# Patient Record
Sex: MALE | Race: WHITE | URBAN - METROPOLITAN AREA
[De-identification: names, ages, dates, MRNs, and addresses within clinical notes are randomized per-mention and may not be internally consistent; named-entity substitution may affect disease eponyms.]

---

## 2024-11-23 ENCOUNTER — HOSPITAL ENCOUNTER (EMERGENCY)
Age: 24
Discharge: HOME | End: 2024-11-23
Attending: EMERGENCY MEDICINE

## 2024-11-23 VITALS
SYSTOLIC BLOOD PRESSURE: 115 MMHG | WEIGHT: 135.36 LBS | OXYGEN SATURATION: 98 % | HEART RATE: 90 BPM | TEMPERATURE: 98.1 F | BODY MASS INDEX: 18.95 KG/M2 | HEIGHT: 71 IN | RESPIRATION RATE: 12 BRPM | DIASTOLIC BLOOD PRESSURE: 84 MMHG

## 2024-11-23 DIAGNOSIS — E10.649 TYPE 1 DIABETES MELLITUS WITH HYPOGLYCEMIA AND WITHOUT COMA (HCC): Primary | ICD-10-CM

## 2024-11-23 LAB
ALBUMIN SERPL-MCNC: 4.3 G/DL (ref 3.4–5)
ALBUMIN/GLOB SERPL: 1.4 {RATIO} (ref 1.1–2.2)
ALP SERPL-CCNC: 126 U/L (ref 40–129)
ALT SERPL-CCNC: 16 U/L (ref 10–40)
ANION GAP SERPL CALCULATED.3IONS-SCNC: 14 MMOL/L (ref 3–16)
AST SERPL-CCNC: 23 U/L (ref 15–37)
BASE EXCESS BLDV CALC-SCNC: -1.3 MMOL/L
BASOPHILS # BLD: 0.1 K/UL (ref 0–0.2)
BASOPHILS NFR BLD: 0.6 %
BETA-HYDROXYBUTYRATE: 0.09 MMOL/L (ref 0–0.27)
BILIRUB SERPL-MCNC: <0.2 MG/DL (ref 0–1)
BUN SERPL-MCNC: 11 MG/DL (ref 7–20)
CALCIUM SERPL-MCNC: 9.6 MG/DL (ref 8.3–10.6)
CHLORIDE SERPL-SCNC: 104 MMOL/L (ref 99–110)
CO2 BLDV-SCNC: 27 MMOL/L
CO2 SERPL-SCNC: 22 MMOL/L (ref 21–32)
COHGB MFR BLDV: 2.8 %
CREAT SERPL-MCNC: 0.8 MG/DL (ref 0.9–1.3)
DEPRECATED RDW RBC AUTO: 14.9 % (ref 12.4–15.4)
EOSINOPHIL # BLD: 0.1 K/UL (ref 0–0.6)
EOSINOPHIL NFR BLD: 1.2 %
GFR SERPLBLD CREATININE-BSD FMLA CKD-EPI: >90 ML/MIN/{1.73_M2}
GLUCOSE BLD-MCNC: 117 MG/DL (ref 70–99)
GLUCOSE BLD-MCNC: 147 MG/DL (ref 70–99)
GLUCOSE BLD-MCNC: 38 MG/DL (ref 70–99)
GLUCOSE BLD-MCNC: 40 MG/DL (ref 70–99)
GLUCOSE SERPL-MCNC: 83 MG/DL (ref 70–99)
HCO3 BLDV-SCNC: 26 MMOL/L (ref 23–29)
HCT VFR BLD AUTO: 45.2 % (ref 40.5–52.5)
HGB BLD-MCNC: 15.2 G/DL (ref 13.5–17.5)
LYMPHOCYTES # BLD: 2.2 K/UL (ref 1–5.1)
LYMPHOCYTES NFR BLD: 19.5 %
MAGNESIUM SERPL-MCNC: 2 MG/DL (ref 1.8–2.4)
MCH RBC QN AUTO: 30.7 PG (ref 26–34)
MCHC RBC AUTO-ENTMCNC: 33.6 G/DL (ref 31–36)
MCV RBC AUTO: 91.4 FL (ref 80–100)
METHGB MFR BLDV: 0.6 %
MONOCYTES # BLD: 1 K/UL (ref 0–1.3)
MONOCYTES NFR BLD: 8.3 %
NEUTROPHILS # BLD: 8.1 K/UL (ref 1.7–7.7)
NEUTROPHILS NFR BLD: 70.4 %
O2 THERAPY: ABNORMAL
PCO2 BLDV: 50.6 MMHG (ref 40–50)
PERFORMED ON: ABNORMAL
PH BLDV: 7.31 [PH] (ref 7.35–7.45)
PLATELET # BLD AUTO: 503 K/UL (ref 135–450)
PMV BLD AUTO: 7.6 FL (ref 5–10.5)
PO2 BLDV: 32 MMHG
POTASSIUM SERPL-SCNC: 3.5 MMOL/L (ref 3.5–5.1)
PROT SERPL-MCNC: 7.3 G/DL (ref 6.4–8.2)
RBC # BLD AUTO: 4.95 M/UL (ref 4.2–5.9)
SAO2 % BLDV: 55 %
SODIUM SERPL-SCNC: 140 MMOL/L (ref 136–145)
WBC # BLD AUTO: 11.5 K/UL (ref 4–11)

## 2024-11-23 PROCEDURE — 82010 KETONE BODYS QUAN: CPT

## 2024-11-23 PROCEDURE — 99284 EMERGENCY DEPT VISIT MOD MDM: CPT

## 2024-11-23 PROCEDURE — 2500000003 HC RX 250 WO HCPCS

## 2024-11-23 PROCEDURE — 83735 ASSAY OF MAGNESIUM: CPT

## 2024-11-23 PROCEDURE — 80053 COMPREHEN METABOLIC PANEL: CPT

## 2024-11-23 PROCEDURE — 6370000000 HC RX 637 (ALT 250 FOR IP): Performed by: EMERGENCY MEDICINE

## 2024-11-23 PROCEDURE — 96374 THER/PROPH/DIAG INJ IV PUSH: CPT

## 2024-11-23 PROCEDURE — 85025 COMPLETE CBC W/AUTO DIFF WBC: CPT

## 2024-11-23 PROCEDURE — 82803 BLOOD GASES ANY COMBINATION: CPT

## 2024-11-23 RX ORDER — NICOTINE POLACRILEX 4 MG
15 LOZENGE BUCCAL ONCE
Status: COMPLETED | OUTPATIENT
Start: 2024-11-23 | End: 2024-11-23

## 2024-11-23 RX ORDER — DEXTROSE MONOHYDRATE 25 G/50ML
25 INJECTION, SOLUTION INTRAVENOUS ONCE
Status: COMPLETED | OUTPATIENT
Start: 2024-11-23 | End: 2024-11-23

## 2024-11-23 RX ORDER — DEXTROSE MONOHYDRATE 25 G/50ML
INJECTION, SOLUTION INTRAVENOUS
Status: COMPLETED
Start: 2024-11-23 | End: 2024-11-23

## 2024-11-23 RX ADMIN — DEXTROSE MONOHYDRATE 25 G: 25 INJECTION, SOLUTION INTRAVENOUS at 13:23

## 2024-11-23 RX ADMIN — DEXTROSE 15 G: 15 GEL ORAL at 13:22

## 2024-11-23 ASSESSMENT — PAIN - FUNCTIONAL ASSESSMENT: PAIN_FUNCTIONAL_ASSESSMENT: NONE - DENIES PAIN

## 2024-11-23 ASSESSMENT — LIFESTYLE VARIABLES
HOW MANY STANDARD DRINKS CONTAINING ALCOHOL DO YOU HAVE ON A TYPICAL DAY: PATIENT DOES NOT DRINK
HOW OFTEN DO YOU HAVE A DRINK CONTAINING ALCOHOL: NEVER

## 2024-11-23 NOTE — ED TRIAGE NOTES
Nausea vomiting starting 8 am. Pt has . Pt took home insulin. EMS came 85 BG. Zofran IV 4mg given, nausea vomiting relented. BG 38 on arrival.

## 2024-11-23 NOTE — DISCHARGE INSTRUCTIONS
Dear Marcello Bustillos,     Thank you for the privilege of caring for you today in the Emergency Department.     Check your blood sugar frequently over the course of the next several days.     Please return to the Emergency Department if you develop any new or worsening symptoms, low blood sugar, passing out or nearly passing out, or for any other concerns.     Regards,     Augie Dinero MD, FACEP

## 2024-11-23 NOTE — ED PROVIDER NOTES
[facility] EMERGENCY DEPARTMENT    CHIEF COMPLAINT  Vomiting (Nausea vomiting starting 8 am. Pt has . Pt took home insulin. EMS came 85 BG. Zofran IV 4mg given, nausea vomiting relented. BG 38 on arrival. )       HISTORY OF PRESENT ILLNESS  Marcello Bustillos is a 24 y.o. male with PMH incl DM1, GERD, ADHD who presents to the ED from a hotel by EMS with concern for nausea vomiting and hyperglycemia. Pt reports feeling well when went to bed. Awoke this morning with nausea and vomiting. BG this morning 335. Administered 55 long acting insulin  and 12 units fast-acting. FSBG for EMS 85. Denies fever, chest pain, SOB, abdominal pain, dysuria. Nausea and vomiting resolved after EMS administered 4 mg Zofran IV. Denies SI, HI. Family arrives shortly into ED course. Notes patient Dexacom was alerting low blood sugar overnight. Pt reports after consuming food this morning, then registered hyperglycemic.     I have reviewed the following from the nursing documentation:    No past medical history on file.  No past surgical history on file.  No family history on file.  Social History     Socioeconomic History    Marital status: Single     Spouse name: Not on file    Number of children: Not on file    Years of education: Not on file    Highest education level: Not on file   Occupational History    Not on file   Tobacco Use    Smoking status: Not on file    Smokeless tobacco: Not on file   Substance and Sexual Activity    Alcohol use: Not on file    Drug use: Not on file    Sexual activity: Not on file   Other Topics Concern    Not on file   Social History Narrative    Not on file     Social Determinants of Health     Financial Resource Strain: Not on file   Food Insecurity: Not on file   Transportation Needs: Not on file   Physical Activity: Not on file   Stress: Not on file   Social Connections: Not on file   Intimate Partner Violence: Not on file   Housing Stability: Not on file     No current